# Patient Record
Sex: MALE | Race: WHITE
[De-identification: names, ages, dates, MRNs, and addresses within clinical notes are randomized per-mention and may not be internally consistent; named-entity substitution may affect disease eponyms.]

---

## 2018-01-09 ENCOUNTER — HOSPITAL ENCOUNTER (EMERGENCY)
Dept: HOSPITAL 45 - C.EDA | Age: 33
Discharge: HOME | End: 2018-01-09
Payer: COMMERCIAL

## 2018-01-09 VITALS — DIASTOLIC BLOOD PRESSURE: 63 MMHG | OXYGEN SATURATION: 98 % | HEART RATE: 75 BPM | SYSTOLIC BLOOD PRESSURE: 121 MMHG

## 2018-01-09 VITALS
HEIGHT: 70 IN | HEIGHT: 70 IN | WEIGHT: 235.67 LBS | BODY MASS INDEX: 33.74 KG/M2 | BODY MASS INDEX: 33.74 KG/M2 | WEIGHT: 235.67 LBS

## 2018-01-09 VITALS — TEMPERATURE: 97.88 F

## 2018-01-09 DIAGNOSIS — Z71.6: ICD-10-CM

## 2018-01-09 DIAGNOSIS — R42: Primary | ICD-10-CM

## 2018-01-09 LAB
BASOPHILS # BLD: 0.03 K/UL (ref 0–0.2)
BASOPHILS NFR BLD: 0.3 %
BUN SERPL-MCNC: 20 MG/DL (ref 7–18)
CALCIUM SERPL-MCNC: 9.3 MG/DL (ref 8.5–10.1)
CO2 SERPL-SCNC: 29 MMOL/L (ref 21–32)
CREAT SERPL-MCNC: 1.1 MG/DL (ref 0.6–1.4)
EOS ABS #: 0.14 K/UL (ref 0–0.5)
EOSINOPHIL NFR BLD AUTO: 228 K/UL (ref 130–400)
GLUCOSE SERPL-MCNC: 86 MG/DL (ref 70–99)
HCT VFR BLD CALC: 46.2 % (ref 42–52)
HGB BLD-MCNC: 16.8 G/DL (ref 14–18)
IG#: 0.07 K/UL (ref 0–0.02)
IMM GRANULOCYTES NFR BLD AUTO: 24.8 %
INR PPP: 1 (ref 0.9–1.1)
LYMPHOCYTES # BLD: 2.85 K/UL (ref 1.2–3.4)
MCH RBC QN AUTO: 31 PG (ref 25–34)
MCHC RBC AUTO-ENTMCNC: 36.4 G/DL (ref 32–36)
MCV RBC AUTO: 85.2 FL (ref 80–100)
MONO ABS #: 1.04 K/UL (ref 0.11–0.59)
MONOCYTES NFR BLD: 9 %
NEUT ABS #: 7.37 K/UL (ref 1.4–6.5)
NEUTROPHILS # BLD AUTO: 1.2 %
NEUTROPHILS NFR BLD AUTO: 64.1 %
PMV BLD AUTO: 9.9 FL (ref 7.4–10.4)
POTASSIUM SERPL-SCNC: 3.9 MMOL/L (ref 3.5–5.1)
PTT PATIENT: 23.9 SECONDS (ref 21–31)
RED CELL DISTRIBUTION WIDTH CV: 12.5 % (ref 11.5–14.5)
RED CELL DISTRIBUTION WIDTH SD: 38.7 FL (ref 36.4–46.3)
SODIUM SERPL-SCNC: 137 MMOL/L (ref 136–145)
WBC # BLD AUTO: 11.5 K/UL (ref 4.8–10.8)

## 2018-01-09 NOTE — EMERGENCY ROOM VISIT NOTE
History


Report prepared by Heena:  Miguel Ariza


Under the Supervision of:  Dr. Manuel Hurt M.D.


First contact with patient:  06:33


Chief Complaint:  SYNCOPE (NEAR SYNCOPE)


Stated Complaint:  NEAR SYNCOPE


Nursing Triage Summary:  


patient was working out this morning doing light cardio work when he began to 


feel nauseous and light headed. States he sat himself down before he passed 

out. 


EMS states he was very diaphoretic and weak upon arrival to scene. denies 


passing out.





History of Present Illness


The patient is a 32 year old white male without a past medical history who 

presents to the ED with a cc of an episode of near syncope beginning about 45 

minutes ago. Positive dizziness, lightheaded, nausea, and weakness . Negative 

loss of consciousness, chest pain, shortness of breath, or abdominal pain. 

Earlier this morning, the patient was working out at a boot camp in an office 

space. This was his first time doing the cardio class and his first time 

working out for quite some time. He then began to feel very nauseated and 

lightheaded. He sat down, noting his vision to go black, but he could hear all 

of the people around him. He does not believe that he passed out. He denies any 

family history of cardiac disease, stroke, cancer, or diabetes.





   Source of History:  patient


   Onset:  about 45 minutes ago


   Position:  other (Global)


   Symptom Intensity:  1 episode


   Quality:  other (Near Syncope)


   Timing:  resolved


   Associated Symptoms:  + nausea, + weakness, No LOC, No chest pain, No SOB, 

No abdominal pain


Note:


During the episode, the patient was feeling lightheaded and dizzy.





Review of Systems


See HPI for pertinent positives and negatives.  A total of ten systems were 

reviewed and were otherwise negative.





Past Medical & Surgical


Medical Problems:


(1) No Known Active Medical Problems








Family History





Patient reports no known family medical history.





Social History


Smoking Status:  Never Smoker


Smokeless Tobacco Use:  No


Drug Use:  none


Occupation Status:  employed





Current/Historical Medications


Scheduled


Nutritional Supplements (Fruit & Vegetable Daily), 1 CAP PO DAILY





Allergies


Coded Allergies:  


     No Known Allergies (Verified , 1/9/18)





Physical Exam


Vital Signs











  Date Time  Temp Pulse Resp B/P (MAP) Pulse Ox O2 Delivery O2 Flow Rate FiO2


 


1/9/18 09:16  75 18 121/63 98 Room Air  


 


1/9/18 09:08  77 18 122/78 98   


 


1/9/18 06:37  84      


 


1/9/18 06:36 36.6 73 18 127/70 98 Room Air  











Physical Exam


GENERAL: Awake, alert, well-appearing, NAD


HENT: Normocephalic, atraumatic.


EYES: Normal conjunctiva. Sclera non-icteric.


NECK: Supple. No nuchal rigidity. FROM.


RESPIRATORY: CTAB, no rhonchi, wheezing, crackles


CARDIAC: RRR, no MRG


ABDOMEN: Soft, NTND, BS+


MSK: No chest wall TTP, no LE edema


NEURO: GCS 15, CN 2-12 intact, moves all 4s on command


SKIN: No rash or jaundice noted.





Medical Decision & Procedures


ER Provider


Diagnostic Interpretation:


Radiology results as stated below per my review and radiologist interpretation: 





CHEST ONE VIEW PORTABLE





CLINICAL HISTORY: EVALUATE WEAKNESS dyspnea





COMPARISON STUDY:  No previous studies for comparison.





FINDINGS: The bones soft tissues and hemidiaphragms are normal. The


cardiomediastinal silhouette is normal. The lungs are clear. The pulmonary


vasculature is normal. 





IMPRESSION:  Negative chest. 

















The above report was generated using voice recognition software.  It may contain


grammatical, syntax or spelling errors.














Electronically signed by:  Jason Snow M.D.


1/9/2018 7:17 AM





Dictated Date/Time:  1/9/2018 7:17 AM





Laboratory Results


1/9/18 07:40








Red Blood Count 5.42, Mean Corpuscular Volume 85.2, Mean Corpuscular Hemoglobin 

31.0, Mean Corpuscular Hemoglobin Concent 36.4, Mean Platelet Volume 9.9, 

Neutrophils (%) (Auto) 64.1, Lymphocytes (%) (Auto) 24.8, Monocytes (%) (Auto) 

9.0, Eosinophils (%) (Auto) 1.2, Basophils (%) (Auto) 0.3, Neutrophils # (Auto) 

7.37, Lymphocytes # (Auto) 2.85, Monocytes # (Auto) 1.04, Eosinophils # (Auto) 

0.14, Basophils # (Auto) 0.03





1/9/18 07:40

















Test


  1/9/18


07:40 1/9/18


08:00


 


White Blood Count


  11.50 K/uL


(4.8-10.8) 


 


 


Red Blood Count


  5.42 M/uL


(4.7-6.1) 


 


 


Hemoglobin


  16.8 g/dL


(14.0-18.0) 


 


 


Hematocrit 46.2 % (42-52)  


 


Mean Corpuscular Volume


  85.2 fL


() 


 


 


Mean Corpuscular Hemoglobin


  31.0 pg


(25-34) 


 


 


Mean Corpuscular Hemoglobin


Concent 36.4 g/dl


(32-36) 


 


 


Platelet Count


  228 K/uL


(130-400) 


 


 


Mean Platelet Volume


  9.9 fL


(7.4-10.4) 


 


 


Neutrophils (%) (Auto) 64.1 %  


 


Lymphocytes (%) (Auto) 24.8 %  


 


Monocytes (%) (Auto) 9.0 %  


 


Eosinophils (%) (Auto) 1.2 %  


 


Basophils (%) (Auto) 0.3 %  


 


Neutrophils # (Auto)


  7.37 K/uL


(1.4-6.5) 


 


 


Lymphocytes # (Auto)


  2.85 K/uL


(1.2-3.4) 


 


 


Monocytes # (Auto)


  1.04 K/uL


(0.11-0.59) 


 


 


Eosinophils # (Auto)


  0.14 K/uL


(0-0.5) 


 


 


Basophils # (Auto)


  0.03 K/uL


(0-0.2) 


 


 


RDW Standard Deviation


  38.7 fL


(36.4-46.3) 


 


 


RDW Coefficient of Variation


  12.5 %


(11.5-14.5) 


 


 


Immature Granulocyte % (Auto) 0.6 %  


 


Immature Granulocyte # (Auto)


  0.07 K/uL


(0.00-0.02) 


 


 


Prothrombin Time


  10.1 SECONDS


(9.0-12.0) 


 


 


Prothromb Time International


Ratio 1.0 (0.9-1.1) 


  


 


 


Activated Partial


Thromboplast Time 23.9 SECONDS


(21.0-31.0) 


 


 


Partial Thromboplastin Ratio 0.9  


 


Anion Gap


  4.0 mmol/L


(3-11) 


 


 


Est Creatinine Clear Calc


Drug Dose 119.1 ml/min 


  


 


 


Estimated GFR (


American) 103.1 


  


 


 


Estimated GFR (Non-


American 89.0 


  


 


 


BUN/Creatinine Ratio 18.4 (10-20)  


 


Calcium Level


  9.3 mg/dl


(8.5-10.1) 


 


 


Magnesium Level


  2.4 mg/dl


(1.8-2.4) 


 


 


Thyroid Stimulating Hormone


(TSH) 1.130 uIu/ml


(0.300-4.500) 


 


 


Urine Color  YELLOW 


 


Urine Appearance  CLEAR (CLEAR) 


 


Urine pH  5.5 (4.5-7.5) 


 


Urine Specific Gravity


  


  1.025


(1.000-1.030)


 


Urine Protein  1+ (NEG) 


 


Urine Glucose (UA)  NEG (NEG) 


 


Urine Ketones  NEG (NEG) 


 


Urine Occult Blood  NEG (NEG) 


 


Urine Nitrite  NEG (NEG) 


 


Urine Bilirubin  NEG (NEG) 


 


Urine Urobilinogen  NEG (NEG) 


 


Urine Leukocyte Esterase  NEG (NEG) 


 


Urine WBC (Auto)  1-5 /hpf (0-5) 


 


Urine RBC (Auto)  0-4 /hpf (0-4) 


 


Urine Hyaline Casts (Auto)


  


  10-30 /lpf


(0-5)


 


Urine Epithelial Cells (Auto)


  


  10-20 /lpf


(0-5)


 


Urine Bacteria (Auto)  NEG (NEG) 


 


Urine Pathogenic Casts


  


  5-10 GRANULAR


CASTS /lpf (0)





Laboratory results reviewed by me





ECG


Indication:  syncope (near)


Rate (beats per minute):  78


Rhythm:  normal sinus


Findings:  other (Normal intervals, normal axis, no STS changes or TWI)





ED Course


0633: The patient was evaluated in room A3. A complete history and physical 

exam was performed.





0850: I reevaluated the patient. Discussed results and discharge instructions: 

He verbalized understanding and agreement. The patient is ready for discharge.





Medical Decision


The patient is a 32 year old white male without a past medical history who 

presents to the ED with a cc of an episode of near syncope beginning about 45 

minutes ago. Positive dizziness, lightheaded, nausea, and weakness . Negative 

loss of consciousness, chest pain, shortness of breath, or abdominal pain. 





Differential diagnosis:


Etiologies such as vasovagal event, infection, hypoglycemia, electrolyte 

abnormalities, cardiac sources, intracerebral event, toxicologic, neurologic, 

as well as others were entertained.





Patient was seen and evaluated the bedside.  Patient states that he was at a 

boot camp class this morning.  Patient does not normally attends type of work 

out nor does he get up at this time.  Patient states that in the middle the 

workout the patient did develop some nausea and lightheadedness.  Patient 

states that his vision didn't put that he did not pass out.  Patient denies any 

focal numbness tingling or weakness.  Patient on exam is very well-appearing 

and has no focal neurologic deficit.  Patient denies any family history of 

unexplained deaths or arrhythmias.  She denies any chest pain or shortness of 

breath.  Patient's EKG was nonischemic does not show any acute arrhythmia.  

Patient's chest x-ray clear.  Patient blood work was fairly unremarkable.  

Patient was told that he needs to slowly integrate himself back into exercising 

as he does not normally do this often with regard to the cardio exercise.  

Patient was told to continue ample fluid hydration and to avoid things like 

alcohol and tobacco.  Patient was deemed suitable for outpatient follow-up and 

treatment.  Patient was advised to follow-up with his PCP which she stated he 

has a physical exam appointment later this month. Patient was given strict 

follow-up, discharge, and return precautions.  All questions were answered.  

Patient was deemed suitable for outpatient follow-up at this time.  Patient 

agreed with the plan of care and was safely discharged home.








Medication Reconcilliation


Current Medication List:  was personally reviewed by me





Blood Pressure Screening


Patient's blood pressure:  Normal blood pressure


Blood pressure disposition:  Did not require urgent referral





Impression





 Primary Impression:  


 Lightheadedness


 Additional Impression:  


 Encounter for smoking cessation counseling





Scribe Attestation


The scribe's documentation has been prepared under my direction and personally 

reviewed by me in its entirety. I confirm that the note above accurately 

reflects all work, treatment, procedures, and medical decision making performed 

by me.





Departure Information


Dispostion


Home / Self-Care





Forms


HOME CARE DOCUMENTATION FORM,                                                 

               IMPORTANT VISIT INFORMATION





Patient Instructions


ED Dizziness [facility], ED Smoking Cessation, [facility]





Additional Instructions





Please return to the emergency department if you have worsening or recurrent 

symptoms not amenable to at-home treatment.  Please call for a follow-up 

appointment with her primary care physician.  Please take your medications as 

prescribed.  If you have other concerns and/or complaints please feel free to 

also call your primary care physician's office or return the ED for further 

evaluation, management, and treatment.





You have been examined and treated today on an emergency basis only. This is 

not a substitute for, or an effort to provide, complete comprehensive medical 

care. It is impossible to recognize and treat all injuries or illnesses in a 

single emergency department visit. It is therefore important that you follow up 

closely with [facility], your PCP, and/or your specialist(s). 

Call as soon as possible for an appointment.





Thank you for your time and consideration. I look forward to speaking with you 

again soon. Please don't hesitate to call us if you have any questions.





Problem Qualifiers

## 2018-01-09 NOTE — DIAGNOSTIC IMAGING REPORT
CHEST ONE VIEW PORTABLE



CLINICAL HISTORY: EVALUATE WEAKNESS dyspnea



COMPARISON STUDY:  No previous studies for comparison.



FINDINGS: The bones soft tissues and hemidiaphragms are normal. The

cardiomediastinal silhouette is normal. The lungs are clear. The pulmonary

vasculature is normal. 



IMPRESSION:  Negative chest. 











The above report was generated using voice recognition software.  It may contain

grammatical, syntax or spelling errors.









Electronically signed by:  Jason Snow M.D.

1/9/2018 7:17 AM



Dictated Date/Time:  1/9/2018 7:17 AM